# Patient Record
(demographics unavailable — no encounter records)

---

## 2025-06-03 NOTE — END OF VISIT
[FreeTextEntry3] : I, Pearl Rodriguez, acted as a scribe on behalf of Dr. Aleida Suazo M.D. on 06/03/2025.   All medical entries made by the scribe were at my, Dr. Aleida Suazo M.D., direction and personally dictated by me on 06/03/2025. I have reviewed the chart and agree that the record accurately reflects my personal performance of the history, physical exam, assessment and plan. I have also personally directed, reviewed, and agreed with the chart.

## 2025-06-03 NOTE — HISTORY OF PRESENT ILLNESS
[Delivery Date: ___] : on [unfilled] [] : delivered by vaginal delivery [Male] : Delivery History: baby boy [Wt. ___] : weighing [unfilled] [Breastfeeding] : currently nursing [Back to Normal] : is back to normal in size [Normal] : the vagina was normal [Doing Well] : is doing well [No Sign of Infection] : is showing no signs of infection [None] : None [Examination Of The Breasts] : breasts are normal [BF with Difficulty] : nursing without difficulty [FreeTextEntry8] : 6 week PPV. 40 yr old female s/p  on . C/o left external vulvar itching. Also c/o itching on anus - pt thinks has possible hemorrhoid. Notices "gooey" fluid during bowel movements. Denies diarrhea and constipation. Notes she cleans area with water 6x daily and puts cream on area.  [de-identified] :  40 yr old female s/p  on  presents for PPV. Vaginitis panel. Rx estrogen cream. Advised not to scratch irritated area. Recommended coconut oil on the vulva. Full clearance. RTO for annual in 4-6 months.  affirm done f./u results Aleida Suazo MD

## 2025-06-25 NOTE — HEALTH RISK ASSESSMENT
[0] : 2) Feeling down, depressed, or hopeless: Not at all (0) [PHQ-2 Negative - No further assessment needed] : PHQ-2 Negative - No further assessment needed [Never] : Never [[id number]] : 0

## 2025-06-25 NOTE — REVIEW OF SYSTEMS
[Negative] : Neurological [Abdominal Pain] : no abdominal pain [Nausea] : no nausea [Constipation] : no constipation [Diarrhea] : diarrhea [Vomiting] : no vomiting [Heartburn] : no heartburn [Melena] : no melena [FreeTextEntry7] : blood in the stool

## 2025-06-25 NOTE — HISTORY OF PRESENT ILLNESS
[Home] : at home, [unfilled] , at the time of the visit. [Medical Office: ([facility])___] : at the medical office located in  [Telehealth (audio & video)] : This visit was provided via telehealth using real-time 2-way audio visual technology. [Verbal consent obtained from patient] : the patient, [unfilled] [FreeTextEntry1] : cc: postartum, needs HBV, n blood  in the stool  [de-identified] : Pt is postpartum, she needs Hep B vacc, non immune, She denies CP,SOB, abd pain, + blood in the stool.

## 2025-06-25 NOTE — HISTORY OF PRESENT ILLNESS
[Home] : at home, [unfilled] , at the time of the visit. [Medical Office: ([facility])___] : at the medical office located in  [Telehealth (audio & video)] : This visit was provided via telehealth using real-time 2-way audio visual technology. [Verbal consent obtained from patient] : the patient, [unfilled] [FreeTextEntry1] : cc: postartum, needs HBV, n blood  in the stool  [de-identified] : Pt is postpartum, she needs Hep B vacc, non immune, She denies CP,SOB, abd pain, + blood in the stool.

## 2025-06-25 NOTE — HEALTH RISK ASSESSMENT
[0] : 2) Feeling down, depressed, or hopeless: Not at all (0) [PHQ-2 Negative - No further assessment needed] : PHQ-2 Negative - No further assessment needed [Never] : Never [[id number]] : 0